# Patient Record
Sex: MALE | Race: WHITE | HISPANIC OR LATINO | Employment: FULL TIME | URBAN - METROPOLITAN AREA
[De-identification: names, ages, dates, MRNs, and addresses within clinical notes are randomized per-mention and may not be internally consistent; named-entity substitution may affect disease eponyms.]

---

## 2018-02-20 ENCOUNTER — OFFICE VISIT (OUTPATIENT)
Dept: INTERNAL MEDICINE CLINIC | Facility: CLINIC | Age: 48
End: 2018-02-20
Payer: COMMERCIAL

## 2018-02-20 VITALS
OXYGEN SATURATION: 98 % | HEART RATE: 70 BPM | DIASTOLIC BLOOD PRESSURE: 64 MMHG | SYSTOLIC BLOOD PRESSURE: 108 MMHG | HEIGHT: 68 IN | WEIGHT: 182.6 LBS | BODY MASS INDEX: 27.68 KG/M2 | TEMPERATURE: 99 F

## 2018-02-20 DIAGNOSIS — R19.8 RECTAL TENESMUS: ICD-10-CM

## 2018-02-20 DIAGNOSIS — K59.04 CHRONIC IDIOPATHIC CONSTIPATION: ICD-10-CM

## 2018-02-20 DIAGNOSIS — R05.9 COUGH: ICD-10-CM

## 2018-02-20 DIAGNOSIS — Z00.00 HEALTH CARE MAINTENANCE: Primary | ICD-10-CM

## 2018-02-20 DIAGNOSIS — M19.90 ARTHRITIS: ICD-10-CM

## 2018-02-20 DIAGNOSIS — R63.4 WEIGHT DECREASED: ICD-10-CM

## 2018-02-20 PROCEDURE — 99204 OFFICE O/P NEW MOD 45 MIN: CPT | Performed by: INTERNAL MEDICINE

## 2018-02-20 NOTE — PROGRESS NOTES
Assessment/Plan:    1  Rectal Tenesmus and Constipation  He reports he has a good appetite, but still has lost weight from 220 lbs to 180 lbs in recent months  A CT abdomen and pelvis without contrast will also be performed prior to next appointment  He was advised to schedule a colonoscopy with GI at his earliest convenience  2  Smoking Cessation  He currently smokes 1-2 cigarettes daily  He was advised to cease smoking completely  A CXR was ordered to evaluate his cough and bronchitis  3  Knee and shoulder Pain  He reports that his knees ache and swell considerably on impact  An XR of the right knee will be ordered  4  Health maintenance  He will follow up with us in 1 month  Diagnoses and all orders for this visit:    Health care maintenance  -     Comprehensive metabolic panel; Future  -     CBC and differential; Future  -     Lipid Panel with Direct LDL reflex; Future    Cough  -     XR chest pa & lateral; Future    Arthritis  -     Sedimentation rate, automated; Future  -     XR knee 1 or 2 vw right; Future  -     MARIAH Screen w/ Reflex to Titer/Pattern; Future    Weight decreased  -     TSH, 3rd generation; Future  -     Ambulatory referral to Gastroenterology; Future  -     CT abdomen pelvis wo contrast; Future  -     Hemoglobin A1c; Future    Chronic idiopathic constipation  -     Ambulatory referral to Gastroenterology; Future  -     CT abdomen pelvis wo contrast; Future    Rectal tenesmus  -     Ambulatory referral to Gastroenterology; Future  -     CT abdomen pelvis wo contrast; Future          Subjective:      Patient ID: Tu Taylor is a 50 y o  male  Tu Taylor is a 50year old male who presents today as a new patient to establish care because he has not seen a physician in 7 years  He has a good appetite, but still has lost weight from 220 lbs to 180 lbs in recent months  He reports constipation, blood with wiping, and polyuria   He also feels he has trouble emptying his bladder  He reports pain in shoulder and knee joints  His knees swell considerably quickly on impact  On baseline, his right knee is swollen  He is a current smoker with 1-2 cigarettes daily  He has had a cough and bronchitis in the past          The following portions of the patient's history were reviewed and updated as appropriate: allergies, current medications, past family history, past medical history, past social history, past surgical history and problem list     Review of Systems   Constitutional: Positive for unexpected weight change  Negative for activity change, appetite change and chills  HENT: Negative for ear discharge, ear pain, facial swelling, hearing loss, mouth sores, nosebleeds, postnasal drip, rhinorrhea, sinus pain and sinus pressure  Eyes: Positive for pain  Negative for discharge, itching and visual disturbance (has glasses)  Respiratory: Positive for cough  Negative for choking, chest tightness, shortness of breath, wheezing and stridor  Cardiovascular: Negative for chest pain, palpitations and leg swelling  Gastrointestinal: Positive for anal bleeding, constipation and rectal pain  Negative for abdominal distention, abdominal pain, blood in stool, diarrhea, nausea and vomiting  Endocrine: Positive for polyuria  Negative for cold intolerance, heat intolerance, polydipsia and polyphagia  Genitourinary: Positive for enuresis  Negative for difficulty urinating (tenesmus), dysuria, flank pain, frequency, genital sores, hematuria and urgency  Musculoskeletal: Positive for arthralgias (knees and  shoulders ) and joint swelling (knee rt  knee  swollen )  Negative for back pain, gait problem, myalgias, neck pain and neck stiffness  Skin: Negative  Neurological: Negative  Hematological: Negative  Psychiatric/Behavioral: Negative            Objective:      /64 (BP Location: Left arm, Patient Position: Sitting, Cuff Size: Standard)   Pulse 70   Temp 99 °F (37 2 °C) (Oral)   Ht 5' 7 5" (1 715 m)   Wt 82 8 kg (182 lb 9 6 oz)   SpO2 98%   BMI 28 18 kg/m²          Physical Exam   Constitutional: He is oriented to person, place, and time  He appears well-developed and well-nourished  No distress  HENT:   Head: Normocephalic and atraumatic  Right Ear: External ear normal    Left Ear: External ear normal    Nose: Nose normal    Mouth/Throat: Oropharynx is clear and moist  No oropharyngeal exudate  Eyes: Conjunctivae are normal  Pupils are equal, round, and reactive to light  Right eye exhibits no discharge  Left eye exhibits no discharge  Neck: Normal range of motion  Neck supple  Cardiovascular: Normal rate, regular rhythm and normal heart sounds  Pulmonary/Chest: No respiratory distress  He has no wheezes  He has no rales  He exhibits no tenderness  Abdominal: He exhibits no distension and no mass  There is no tenderness  There is no rebound and no guarding  Musculoskeletal: He exhibits no edema  Neurological: He is alert and oriented to person, place, and time  He displays normal reflexes  No cranial nerve deficit  Coordination normal    Skin: Skin is warm and dry  He is not diaphoretic  Psychiatric: He has a normal mood and affect  His behavior is normal  Thought content normal          Scribe Signature and Attestaion:  By signing my name below, Dedra Olivas attest that this documentation has been prepared under the direction and in the presence of Dr Emerson Henderson MD  Electronically signed: Effie Rocha  2/20/18  Provider Attestation:  I, Dr Emerson Henderson, personally performed the services described in this documentation  All medical record entries made by the effie were at my direction and in my presence  I have reviewed the chart and discharge instructions (if applicable) and agree that the record reflects my personal performance and is accurate and complete   Dr Emerson Henderson MD  2/20/18

## 2019-10-24 ENCOUNTER — TELEPHONE (OUTPATIENT)
Dept: INTERNAL MEDICINE CLINIC | Age: 49
End: 2019-10-24